# Patient Record
Sex: MALE | Race: WHITE | NOT HISPANIC OR LATINO | Employment: OTHER | URBAN - METROPOLITAN AREA
[De-identification: names, ages, dates, MRNs, and addresses within clinical notes are randomized per-mention and may not be internally consistent; named-entity substitution may affect disease eponyms.]

---

## 2017-11-06 ENCOUNTER — HISTORICAL (OUTPATIENT)
Dept: RADIOLOGY | Facility: HOSPITAL | Age: 77
End: 2017-11-06

## 2021-01-22 ENCOUNTER — HISTORICAL (OUTPATIENT)
Dept: ADMINISTRATIVE | Facility: HOSPITAL | Age: 81
End: 2021-01-22

## 2021-02-26 ENCOUNTER — HISTORICAL (OUTPATIENT)
Dept: ADMINISTRATIVE | Facility: HOSPITAL | Age: 81
End: 2021-02-26

## 2022-04-30 NOTE — OP NOTE
Patient:   Minoo Lux Jr            MRN: [id number]            FIN: [id number]               Age:   80 years     Sex:  Male     :  1940   Associated Diagnoses:   None   Author:   Chau Dash MD      Preoperative Diagnosis: Cataract Right eye    Postoperative Diagnosis: Cataract Right eye    Procedure: Phacoemulsification with intaocular lens implantations Right eye    Surgeon: Chau Dash MD    Assistant: Patricia Choi [facility]    Anestheisa: MAC    Complications: None    The patient was brought into the operating suite, where the patient was correctly identified as was the operative eye via timeout.  The patient was prepped and draped in a sterile ophthalmic fashion.  A lid speculum was placed in the operative eye and the microscope was brought into place.  A 1.0mm paracentesis was then made at (12) o'clock.  The anterior chamber was filled with Endocoat.  A (temporal) clear corneal incision was made with a 2.4 mm keratome.  A 6 mm corneal marking ring was used to peng the cornea centered over the visual axis.  A 5.00 mm continuous curvilinear capsulorhexis was fashioned using a cystotome and microcapsular forceps.  Hydrodissection and hydrodelineation was performed with upreserved 1% Xylocaine.  The nucleus was then phacoemulsified with the Abbott phacoemulsification hand-piece with a total of (4) EFX.  The cortex was then removed with the I/A hand-piece. The lens model (ZCB00) with a power of (20.5) was placed in the capsular bag.  The Helon was then removed from the eye with the I/A hand piece.  The anterior chamber was inflated and the wounds were hydrated with BSS.  The wounds were checked with Weck-Lulu sponges and found to be watertight.  The lid speculum was removed and topical antibiotics were placed on the operative eye.  The patient was brought to PACU in good condition.

## 2022-04-30 NOTE — OP NOTE
Patient:   Minoo Lux Jr            MRN: [id number]            FIN: [id number]               Age:   80 years     Sex:  Male     :  1940   Associated Diagnoses:   None   Author:   Chau Dash MD      Preoperative Diagnosis: Cataract Left eye    Postoperative Diagnosis: Cataract Left eye    Procedure: Phacoemulsification with intaocular lens implantations Left eye    Surgeon: Chau Dash MD    Assistant: Patricia Choi [facility]    Anestheisa: MAC    Complications: None    The patient was brought into the operating suite, where the patient was correctly identified as was the operative eye via timeout.  The patient was prepped and draped in a sterile ophthalmic fashion.  A lid speculum was placed in the operative eye and the microscope was brought into place.  A 1.0mm paracentesis was then made at (6) o'clock.  The anterior chamber was filled with Endocoat.  A (temporal) clear corneal incision was made with a 2.4 mm keratome.  A 6 mm corneal marking ring was used to peng the cornea centered over the visual axis.  A 5.00 mm continuous curvilinear capsulorhexis was fashioned using a cystotome and microcapsular forceps.  Hydrodissection and hydrodelineation was performed with upreserved 1% Xylocaine.  The nucleus was then phacoemulsified with the Abbott phacoemulsification hand-piece with a total of (5) EFX.  The cortex was then removed with the I/A hand-piece. The lens model (ZCB00) with a power of (21.0) was placed in the capsular bag.  The Helon was then removed from the eye with the I/A hand piece.  The anterior chamber was inflated and the wounds were hydrated with BSS.  The wounds were checked with Weck-Lulu sponges and found to be watertight.  The lid speculum was removed and topical antibiotics were placed on the operative eye.  The patient was brought to PACU in good condition.

## 2022-11-30 RX ORDER — OMEPRAZOLE 20 MG/1
20 CAPSULE, DELAYED RELEASE ORAL DAILY
COMMUNITY

## 2022-11-30 RX ORDER — ROSUVASTATIN CALCIUM 40 MG/1
10 TABLET, COATED ORAL NIGHTLY
COMMUNITY

## 2022-11-30 RX ORDER — SERTRALINE HYDROCHLORIDE 100 MG/1
100 TABLET, FILM COATED ORAL DAILY
COMMUNITY

## 2022-11-30 RX ORDER — CEVIMELINE HYDROCHLORIDE 30 MG/1
30 CAPSULE ORAL 3 TIMES DAILY
COMMUNITY

## 2022-11-30 RX ORDER — LISINOPRIL 20 MG/1
20 TABLET ORAL DAILY
COMMUNITY

## 2022-11-30 RX ORDER — GABAPENTIN 300 MG/1
300 CAPSULE ORAL 3 TIMES DAILY
COMMUNITY

## 2022-11-30 RX ORDER — MECLIZINE HYDROCHLORIDE 50 MG/1
50 TABLET ORAL 3 TIMES DAILY PRN
COMMUNITY

## 2022-11-30 RX ORDER — PROPRANOLOL HYDROCHLORIDE 10 MG/1
10 TABLET ORAL 3 TIMES DAILY
COMMUNITY

## 2022-12-01 ENCOUNTER — CLINICAL SUPPORT (OUTPATIENT)
Dept: PREADMISSION TESTING | Facility: HOSPITAL | Age: 82
End: 2022-12-01
Attending: SURGERY
Payer: OTHER GOVERNMENT

## 2022-12-01 DIAGNOSIS — Z01.818 OTHER SPECIFIED PRE-OPERATIVE EXAMINATION: Primary | ICD-10-CM

## 2022-12-01 DIAGNOSIS — Z01.818 OTHER SPECIFIED PRE-OPERATIVE EXAMINATION: ICD-10-CM

## 2022-12-01 PROCEDURE — 93005 ELECTROCARDIOGRAM TRACING: CPT

## 2022-12-01 PROCEDURE — 93010 ELECTROCARDIOGRAM REPORT: CPT | Mod: ,,, | Performed by: STUDENT IN AN ORGANIZED HEALTH CARE EDUCATION/TRAINING PROGRAM

## 2022-12-01 PROCEDURE — 93010 EKG 12-LEAD: ICD-10-PCS | Mod: ,,, | Performed by: STUDENT IN AN ORGANIZED HEALTH CARE EDUCATION/TRAINING PROGRAM

## 2022-12-06 ENCOUNTER — HOSPITAL ENCOUNTER (OUTPATIENT)
Facility: HOSPITAL | Age: 82
Discharge: HOME | End: 2022-12-06
Attending: SURGERY | Admitting: SURGERY
Payer: OTHER GOVERNMENT

## 2022-12-06 ENCOUNTER — ANESTHESIA EVENT (OUTPATIENT)
Dept: SURGERY | Facility: HOSPITAL | Age: 82
End: 2022-12-06
Payer: OTHER GOVERNMENT

## 2022-12-06 ENCOUNTER — ANESTHESIA (OUTPATIENT)
Dept: SURGERY | Facility: HOSPITAL | Age: 82
End: 2022-12-06
Payer: OTHER GOVERNMENT

## 2022-12-06 PROCEDURE — 37000009 HC ANESTHESIA EA ADD 15 MINS: Performed by: SURGERY

## 2022-12-06 PROCEDURE — 71000033 HC RECOVERY, INTIAL HOUR: Performed by: SURGERY

## 2022-12-06 PROCEDURE — 25000003 PHARM REV CODE 250

## 2022-12-06 PROCEDURE — 01810 ANES PX NRV MUSC F/ARM WRST: CPT | Performed by: SURGERY

## 2022-12-06 PROCEDURE — 63600175 PHARM REV CODE 636 W HCPCS: Performed by: NURSE ANESTHETIST, CERTIFIED REGISTERED

## 2022-12-06 PROCEDURE — 36000707: Performed by: SURGERY

## 2022-12-06 PROCEDURE — 71000015 HC POSTOP RECOV 1ST HR: Performed by: SURGERY

## 2022-12-06 PROCEDURE — 36000706: Performed by: SURGERY

## 2022-12-06 PROCEDURE — 37000008 HC ANESTHESIA 1ST 15 MINUTES: Performed by: SURGERY

## 2022-12-06 PROCEDURE — 63600175 PHARM REV CODE 636 W HCPCS: Performed by: ANESTHESIOLOGY

## 2022-12-06 PROCEDURE — 64415 NJX AA&/STRD BRCH PLXS IMG: CPT | Mod: 59,LT | Performed by: ANESTHESIOLOGY

## 2022-12-06 PROCEDURE — 63600175 PHARM REV CODE 636 W HCPCS

## 2022-12-06 PROCEDURE — 25000003 PHARM REV CODE 250: Performed by: NURSE ANESTHETIST, CERTIFIED REGISTERED

## 2022-12-06 PROCEDURE — 76942 ECHO GUIDE FOR BIOPSY: CPT | Performed by: ANESTHESIOLOGY

## 2022-12-06 PROCEDURE — 71000016 HC POSTOP RECOV ADDL HR: Performed by: SURGERY

## 2022-12-06 RX ORDER — LIDOCAINE HYDROCHLORIDE 10 MG/ML
INJECTION, SOLUTION EPIDURAL; INFILTRATION; INTRACAUDAL; PERINEURAL
Status: DISCONTINUED | OUTPATIENT
Start: 2022-12-06 | End: 2022-12-06

## 2022-12-06 RX ORDER — SODIUM CITRATE AND CITRIC ACID MONOHYDRATE 334; 500 MG/5ML; MG/5ML
SOLUTION ORAL
Status: COMPLETED
Start: 2022-12-06 | End: 2022-12-06

## 2022-12-06 RX ORDER — SODIUM CHLORIDE, SODIUM GLUCONATE, SODIUM ACETATE, POTASSIUM CHLORIDE AND MAGNESIUM CHLORIDE 30; 37; 368; 526; 502 MG/100ML; MG/100ML; MG/100ML; MG/100ML; MG/100ML
INJECTION, SOLUTION INTRAVENOUS CONTINUOUS
Status: DISCONTINUED | OUTPATIENT
Start: 2022-12-06 | End: 2022-12-06

## 2022-12-06 RX ORDER — ROPIVACAINE HYDROCHLORIDE 5 MG/ML
INJECTION, SOLUTION EPIDURAL; INFILTRATION; PERINEURAL
Status: COMPLETED
Start: 2022-12-06 | End: 2022-12-06

## 2022-12-06 RX ORDER — ROPIVACAINE HYDROCHLORIDE 5 MG/ML
INJECTION, SOLUTION EPIDURAL; INFILTRATION; PERINEURAL
Status: COMPLETED | OUTPATIENT
Start: 2022-12-06 | End: 2022-12-06

## 2022-12-06 RX ORDER — MIDAZOLAM HYDROCHLORIDE 1 MG/ML
INJECTION INTRAMUSCULAR; INTRAVENOUS
Status: COMPLETED
Start: 2022-12-06 | End: 2022-12-06

## 2022-12-06 RX ORDER — PROPOFOL 10 MG/ML
VIAL (ML) INTRAVENOUS
Status: DISCONTINUED | OUTPATIENT
Start: 2022-12-06 | End: 2022-12-06

## 2022-12-06 RX ORDER — LIDOCAINE HYDROCHLORIDE 10 MG/ML
1 INJECTION, SOLUTION EPIDURAL; INFILTRATION; INTRACAUDAL; PERINEURAL ONCE
Status: DISCONTINUED | OUTPATIENT
Start: 2022-12-06 | End: 2022-12-06

## 2022-12-06 RX ORDER — SODIUM CHLORIDE, SODIUM LACTATE, POTASSIUM CHLORIDE, CALCIUM CHLORIDE 600; 310; 30; 20 MG/100ML; MG/100ML; MG/100ML; MG/100ML
INJECTION, SOLUTION INTRAVENOUS CONTINUOUS
Status: DISCONTINUED | OUTPATIENT
Start: 2022-12-06 | End: 2022-12-06

## 2022-12-06 RX ORDER — EPHEDRINE SULFATE 50 MG/ML
INJECTION, SOLUTION INTRAVENOUS
Status: DISCONTINUED | OUTPATIENT
Start: 2022-12-06 | End: 2022-12-06

## 2022-12-06 RX ORDER — MIDAZOLAM HYDROCHLORIDE 1 MG/ML
1 INJECTION, SOLUTION INTRAMUSCULAR; INTRAVENOUS ONCE
Status: COMPLETED | OUTPATIENT
Start: 2022-12-06 | End: 2022-12-06

## 2022-12-06 RX ORDER — DEXAMETHASONE SODIUM PHOSPHATE 4 MG/ML
INJECTION, SOLUTION INTRA-ARTICULAR; INTRALESIONAL; INTRAMUSCULAR; INTRAVENOUS; SOFT TISSUE
Status: DISCONTINUED | OUTPATIENT
Start: 2022-12-06 | End: 2022-12-06

## 2022-12-06 RX ORDER — SODIUM CITRATE AND CITRIC ACID MONOHYDRATE 334; 500 MG/5ML; MG/5ML
30 SOLUTION ORAL ONCE
Status: COMPLETED | OUTPATIENT
Start: 2022-12-06 | End: 2022-12-06

## 2022-12-06 RX ORDER — ONDANSETRON 2 MG/ML
INJECTION INTRAMUSCULAR; INTRAVENOUS
Status: DISCONTINUED | OUTPATIENT
Start: 2022-12-06 | End: 2022-12-06

## 2022-12-06 RX ADMIN — ONDANSETRON 4 MG: 2 INJECTION INTRAMUSCULAR; INTRAVENOUS at 09:12

## 2022-12-06 RX ADMIN — ROPIVACAINE HYDROCHLORIDE 30 ML: 5 INJECTION, SOLUTION EPIDURAL; INFILTRATION; PERINEURAL at 07:12

## 2022-12-06 RX ADMIN — MIDAZOLAM HYDROCHLORIDE 1 MG: 1 INJECTION, SOLUTION INTRAMUSCULAR; INTRAVENOUS at 07:12

## 2022-12-06 RX ADMIN — LIDOCAINE HYDROCHLORIDE 50 MG: 10 INJECTION, SOLUTION EPIDURAL; INFILTRATION; INTRACAUDAL; PERINEURAL at 08:12

## 2022-12-06 RX ADMIN — DEXAMETHASONE SODIUM PHOSPHATE 4 MG: 4 INJECTION, SOLUTION INTRA-ARTICULAR; INTRALESIONAL; INTRAMUSCULAR; INTRAVENOUS; SOFT TISSUE at 08:12

## 2022-12-06 RX ADMIN — PROPOFOL 30 MG: 10 INJECTION, EMULSION INTRAVENOUS at 08:12

## 2022-12-06 RX ADMIN — SODIUM CITRATE AND CITRIC ACID MONOHYDRATE 30 ML: 334; 500 SOLUTION ORAL at 07:12

## 2022-12-06 RX ADMIN — PROPOFOL 100 MG: 10 INJECTION, EMULSION INTRAVENOUS at 08:12

## 2022-12-06 RX ADMIN — SODIUM CHLORIDE, POTASSIUM CHLORIDE, SODIUM LACTATE AND CALCIUM CHLORIDE: 600; 310; 30; 20 INJECTION, SOLUTION INTRAVENOUS at 08:12

## 2022-12-06 RX ADMIN — EPHEDRINE SULFATE 5 MG: 50 INJECTION INTRAVENOUS at 09:12

## 2022-12-06 RX ADMIN — SODIUM CITRATE AND CITRIC ACID MONOHYDRATE 30 ML: 500; 334 SOLUTION ORAL at 07:12

## 2022-12-06 RX ADMIN — PROPOFOL 40 MG: 10 INJECTION, EMULSION INTRAVENOUS at 08:12

## 2022-12-06 NOTE — ANESTHESIA PROCEDURE NOTES
Peripheral Block    Patient location during procedure: pre-op   Block not for primary anesthetic.  Reason for block: at surgeon's request and post-op pain management   Post-op Pain Location: L   Start time: 12/6/2022 7:52 AM  Timeout: 12/6/2022 7:52 AM   End time: 12/6/2022 8:02 AM    Staffing  Authorizing Provider: Constantine Vázquez MD  Performing Provider: Constantine Vázquez MD    Preanesthetic Checklist  Completed: patient identified, IV checked, site marked, risks and benefits discussed, surgical consent, monitors and equipment checked, pre-op evaluation and timeout performed  Peripheral Block  Patient position: supine  Prep: ChloraPrep and Mask worn, hand hygeine performed, sterile gloves worn  Patient monitoring: cardiac monitor, continuous pulse ox and frequent blood pressure checks (RN monitoring vitals throughout procedure.)  Block type: axillary  Laterality: left  Injection technique: single shot  Needle  Needle type: Stimuplex   Needle gauge: 21 G  Needle length: 2 in  Needle localization: anatomical landmarks and ultrasound guidance  Needle insertion depth: 4 cm   -ultrasound image captured on disc.  Assessment  Injection assessment: negative aspiration, negative parasthesia and local visualized surrounding nerve  Paresthesia pain: none  Heart rate change: no  Slow fractionated injection: yes  Pain Tolerance: comfortable throughout block  Medications:    Medications: ropivacaine (NAROPIN) injection 0.5% - Perineural   30 mL - 12/6/2022 7:59:00 AM    Additional Notes  Block..See EMR for any note re current/previous paresthesia, dysesthesias or chronic pain complaints in limb to be blocked.  US used to observe needle trajectory, needle placed in close proximity to appropriate nerve structures, they appeared anatomically normal.  Deposition of LA in close proximity to nerve structures observed.  Perm image saved.  Stimulation of appropriate muscles noted @ 0.5ma with a + Ankur test.  No resistance to  injection was encountered.  No paraesthesia or aspiration of blood observed.  No pain with injection.  See anesthesia chart for any drugs used to supplement the block. Sedation was provided at a level for patient to be able to communicate any discomfort.

## 2022-12-06 NOTE — CARE UPDATE
Patient awakened,abrams,oriented/reassured his, he denied c/o, exchanging well. He can slightly wiggle his left digits, and can lift left arm slowly, arm-hand-digits retain the numbness from pre-op pain block.

## 2022-12-06 NOTE — DISCHARGE INSTRUCTIONS
Patient Education        Carpal Tunnel Release Discharge Instructions             What care is needed at home?   This is what you will need to know:  Do not remove the dressing.  Wear the sling until tomorrow including sleeping tonight. Use pillows to prop up your wrist. This will help with swelling.  Place an ice pack wrapped in a towel over the painful part. Never put ice right on the skin. Do not leave the ice on more than 10 to 15 minutes at a time. You can do this a 3-4 times a day.  Keep your splint in place. This will keep your wrist from moving. Leave the dressing on until your visit with the doctor. Keep it clean and dry.  You may take a shower starting tomorrow. Wrap your arm in a plastic bag.   Do not lift anything over 10 pounds (4.5 kg).  Ask the doctor when you may go back to your normal activities like work and driving.  Be sure to wash your hands before touching your wound or dressing.    What follow-up care is needed?   Your doctor may ask you to make visits to the office to check on your progress. Be sure to keep these visits.  If you have stitches or staples, you will need to have them taken out. Your doctor will often want to do this in 1 to 2 weeks.    What lifestyle changes are needed?   Avoid doing repetitive movements with your wrist and hand, if possible. If you have to do repeat movements, take rest breaks often.  If you sit at a computer, make sure your keyboard, mouse, desk, and chair are in the right positions so you do not put stress on the wrist. Think about using a special mouse and keyboard that are made to protect your wrist and hand.  If you have an assembly job, try alternating tasks, if possible.  Avoid holding your wrist and hand in the same position for too long.  Massage your wrist, palms, and back of the hands.    What problems could happen?   Infection  Bleeding  Nerve injury  Blood vessel injury  Ongoing wrist pain  Carpal tunnel syndrome returns    When do I need to call  the doctor?   Signs of infection. These include a fever of 100.4°F (38°C) or higher, chills, wound that will not heal.  Signs of wound infection. These include swelling, redness, warmth around the wound; too much pain when touched; yellowish, greenish, or bloody discharge; foul smell coming from the cut site; cut site opens up.  More numbness in hand

## 2022-12-06 NOTE — CARE UPDATE
Dr. Vázquez rounding and updated on his status, v/s. No new orders given and approved his transfer to phase II room at any time.

## 2022-12-06 NOTE — ANESTHESIA PREPROCEDURE EVALUATION
"                                                                                                             12/06/2022  Minoo Lux Jr. is a 82 y.o., male with HTN and other medical problems listed in the EMR      Pre-op Assessment    I have reviewed the Patient Summary Reports.     I have reviewed the Nursing Notes. I have reviewed the NPO Status.   I have reviewed the Medications.     Review of Systems      Physical Exam  General: Well nourished and Cooperative    Airway:  Mallampati: II   Mouth Opening: Normal  TM Distance: Normal  Tongue: Normal  Neck ROM: Normal ROM    Chest/Lungs:  Clear to auscultation    Heart:  Rate: Normal        Anesthesia Plan  Type of Anesthesia, risks & benefits discussed:    Anesthesia Type: Regional  Intra-op Monitoring Plan: Standard ASA Monitors  Post Op Pain Control Plan: multimodal analgesia  Informed Consent: Informed consent signed with the Patient and all parties understand the risks and agree with anesthesia plan.  All questions answered.   ASA Score: 2  Day of Surgery Review of History & Physical: H&P Update referred to the surgeon/provider.    Ready For Surgery From Anesthesia Perspective.     I explained anesthesia plan to patient/responsbile party if available.  Anesthesia consent done going over the material facts, risks, complications & alternatives, obtained which includes the possibility of altering the anesthesia plan.  I reviewed problem list, appropriate labs, any workup, Xray, EKG etc noted below.  Patients condition is satisfactory to proceed with anesthesia plan unless otherwise noted (see anesthesia chart for details of the anesthesia plan carried out).      Pre-operative evaluation for Procedure(s) (LRB):  RELEASE, CARPAL TUNNEL Left   Axillary Block (Left)  RELEASE, HAND, FOR DEQUERVAIN'S TENOSYNOVITIS (Left)    /80   Pulse 61   Temp 36.7 °C (98.1 °F) (Oral)   Ht 5' 6" (1.676 m)   Wt 76.5 kg (168 lb 10.4 oz)   SpO2 (!) 94%   BMI 27.22 " kg/m²     There is no problem list on file for this patient.      Review of patient's allergies indicates:   Allergen Reactions    Quinine sulfate        Current Outpatient Medications   Medication Instructions    cevimeline (EVOXAC) 30 mg, Oral, 3 times daily    gabapentin (NEURONTIN) 300 mg, Oral, 3 times daily    lisinopriL (PRINIVIL,ZESTRIL) 20 mg, Oral, Daily    meclizine (ANTIVERT) 50 mg, Oral, 3 times daily PRN    omeprazole (PRILOSEC) 20 mg, Oral, Daily    propranoloL (INDERAL) 10 mg, Oral, 3 times daily    rosuvastatin (CRESTOR) 10 mg, Oral, Nightly    sertraline (ZOLOFT) 100 mg, Oral, Daily       Past Surgical History:   Procedure Laterality Date    BACK SURGERY      HERNIA REPAIR      MANDIBLE FRACTURE SURGERY         Social History     Socioeconomic History    Marital status:    Tobacco Use    Smoking status: Never    Smokeless tobacco: Never   Substance and Sexual Activity    Alcohol use: Never    Drug use: Never    Sexual activity: Not Currently       No results found for: WBC, HGB, HCT, MCV, PLT       BMP  No results found for: HCT, NA, K, BUN, CREATININE, CALCIUM     INR  No results for input(s): PT, INR, PROTIME, APTT in the last 72 hours.        Diagnostic Studies:      EKG:  Results for orders placed or performed in visit on 12/01/22   EKG 12-lead    Collection Time: 12/01/22  8:12 AM    Narrative    Test Reason : Z01.818,    Vent. Rate : 059 BPM     Atrial Rate : 059 BPM     P-R Int : 184 ms          QRS Dur : 098 ms      QT Int : 420 ms       P-R-T Axes : 044 -21 027 degrees     QTc Int : 415 ms    Sinus bradycardia  Otherwise normal ECG  No previous ECGs available  Confirmed by Rashad Garcia MD ([id number]) on 12/3/2022 8:33:51 AM    Referred By: CARO NICOLE           Confirmed By:Rashad Garcia MD          .

## 2022-12-06 NOTE — CARE UPDATE
Received patient from the OR, he is asleep, respirations full-regular-deep-clear,hob up 30 degrees, left arm in sling and supported by pillow, chin lift not necessary at present, nurse remains at bedside with constant assessment and observation.

## 2022-12-06 NOTE — ANESTHESIA POSTPROCEDURE EVALUATION
Anesthesia Post Evaluation    Patient: Minoo Lux Jr.    Procedure(s) Performed: Procedure(s) (LRB):  RELEASE, CARPAL TUNNEL Left   Axillary Block (Left)  RELEASE, HAND, FOR DEQUERVAIN'S TENOSYNOVITIS (Left)    Final Anesthesia Type: general (/Regional//MAC)      Patient location during evaluation: PACU  Post-procedure mental status: @ basline.  Post-procedure vital signs: reviewed and stable  Pain management: adequate    PONV status: See postop meds for drugs used to control n/v if any.  Anesthetic complications: no      Cardiovascular status: blood pressure returned to baseline  Respiratory status: @ baseline.  Hydration status: euvolemic            Vitals Value Taken Time   /84 12/06/22 1044   Temp 36.4 °C (97.6 °F) 12/06/22 1000   Pulse 73 12/06/22 1044   Resp 18 12/06/22 1000   SpO2 97 % 12/06/22 1044         Event Time   Out of Recovery 09:56:00         Pain/Jane Score: Jane Score: 10 (12/6/2022 10:00 AM)

## 2022-12-06 NOTE — OP NOTE
[facility] [address] [place] [address]    PATIENT NAME:      MENDEL ROUSE  YOB: 1940  CSN:               [id number]  MRN:               [id number]  ADMIT DATE:        12/06/2022 06:44:00  PHYSICIAN:         Fabien Gold MD                          OPERATIVE REPORT      DATE OF SURGERY:    12/06/2022 00:00:00    SURGEON:  Fabien Gold MD    PREOPERATIVE DIAGNOSES:    1. Carpal tunnel, left wrist.  2. De Quervain tenosynovitis, left wrist.    POSTOPERATIVE DIAGNOSES:    1. Carpal tunnel, left wrist.  2. De Quervain tenosynovitis, left wrist.    PROCEDURES:    1. Carpal tunnel release, left wrist.  2. De Quervain release, left wrist.    ANESTHESIA:  Axillary block with supplemental LMA anesthesia.    DESCRIPTION OF PROCEDURE:  In the operating suite under LMA anesthesia, the left   arm was prepped and draped in a sterile fashion.  Arm was exsanguinated and   tourniquet inflated to 250 mmHg.  A transverse incision was placed over the   radial styloid area where there was marked thickening of the 1st compartment.    Skin and subcutaneous tissue incised.  Neurovascular bundle was retracted.  The   1st compartment was identified and opened throughout its entire length with a 69   Manatee blade.  Traction on the tendons revealed no further areas of stenosis.    Saline-soaked gauze was then placed in the incision and attention was turned to   the proximal palm, which was explored through a small proximal curved incision.    Skin and subcutaneous tissue incised.  Bleeding controlled with Bovie cautery.    Median nerve was identified at the level of the distal wrist crease and the   carpal ligament transected on the ulnar border of the canal throughout the   entire length of the canal using a 69 Manatee blade.  During dissection, care was   taken to identify and preserve the recurrent  motor branch.  I tunneled   proximally in distal forearm and released the subcutaneous fascia.  The   epineurium surrounding the nerve was gently teased with fine pickups and   forceps.  Once the nerve was completely freed, the tourniquet was released.    Final hemostasis was obtained.  The de Quervain incision was closed with a   running subcuticular 4-0 Vicryl suture.  The palmar incision was closed with   vertical mattress sutures of 5-0 and 6-0 Prolene.  A sterile splint dressing was   then applied.  Procedure terminated.        ______________________________  MD ZENIA Espinal/AQS  DD:  12/06/2022  Time:  09:25AM  DT:  12/06/2022  Time:  09:45AM  Job #:  [id number]      OPERATIVE REPORT

## 2022-12-06 NOTE — TRANSFER OF CARE
"Anesthesia Transfer of Care Note    Patient: Minoo Lux Jr.    Procedure(s) Performed: Procedure(s) (LRB):  RELEASE, CARPAL TUNNEL Left   Axillary Block (Left)  RELEASE, HAND, FOR DEQUERVAIN'S TENOSYNOVITIS (Left)    Patient location: PACU    Anesthesia Type: general    Transport from OR: Transported from OR on room air with adequate spontaneous ventilation    Post pain: adequate analgesia    Post assessment: no apparent anesthetic complications    Post vital signs: stable    Level of consciousness: responds to stimulation    Nausea/Vomiting: no nausea/vomiting    Complications: none    Transfer of care protocol was followed      Last vitals:   Visit Vitals  /83 (BP Location: Right arm, Patient Position: Lying)   Pulse 81   Temp 36.2 °C (97.2 °F) (Temporal)   Resp 16   Ht 5' 6" (1.676 m)   Wt 76.5 kg (168 lb 10.4 oz)   SpO2 98%   BMI 27.22 kg/m²     "

## 2022-12-06 NOTE — ANESTHESIA PROCEDURE NOTES
Intubation    Date/Time: 12/6/2022 8:39 AM  Performed by: Comfort Tena CRNA  Authorized by: Constantine Vázquez MD     Intubation:     Induction:  Intravenous    Intubated:  Postinduction    Mask Ventilation:  Not attempted    Attempts:  1    Attempted By:  Staff anesthesiologist    Method of Intubation:  Blind intubation    Difficult Airway Encountered?: No      Complications:  None    Airway Device:  Supraglottic airway/LMA    Airway Device Size:  4.0    Style/Cuff Inflation:  Cuffed (inflated to minimal occlusive pressure)    Secured at:  The lips    Placement Verified By:  Capnometry    Complicating Factors:  None    Findings Post-Intubation:  Atraumatic/condition of teeth unchanged

## 2022-12-08 VITALS
SYSTOLIC BLOOD PRESSURE: 149 MMHG | RESPIRATION RATE: 18 BRPM | WEIGHT: 168.63 LBS | TEMPERATURE: 98 F | HEART RATE: 73 BPM | DIASTOLIC BLOOD PRESSURE: 84 MMHG | BODY MASS INDEX: 27.1 KG/M2 | OXYGEN SATURATION: 97 % | HEIGHT: 66 IN

## 2024-12-18 DIAGNOSIS — I69.911 MEMORY DEFICIT FOLLOWING UNSPECIFIED CEREBROVASCULAR DISEASE: Primary | ICD-10-CM

## 2025-04-16 ENCOUNTER — OFFICE VISIT (OUTPATIENT)
Dept: NEUROLOGY | Facility: CLINIC | Age: 85
End: 2025-04-16
Payer: OTHER GOVERNMENT

## 2025-04-16 VITALS
HEIGHT: 66 IN | BODY MASS INDEX: 27 KG/M2 | DIASTOLIC BLOOD PRESSURE: 86 MMHG | WEIGHT: 168 LBS | SYSTOLIC BLOOD PRESSURE: 138 MMHG

## 2025-04-16 DIAGNOSIS — I69.911 MEMORY DEFICIT FOLLOWING UNSPECIFIED CEREBROVASCULAR DISEASE: Primary | ICD-10-CM

## 2025-04-16 DIAGNOSIS — G44.019 EPISODIC CLUSTER HEADACHE, NOT INTRACTABLE: ICD-10-CM

## 2025-04-16 PROCEDURE — 99999 PR PBB SHADOW E&M-EST. PATIENT-LVL III: CPT | Mod: PBBFAC,,, | Performed by: PSYCHIATRY & NEUROLOGY

## 2025-04-16 PROCEDURE — 99205 OFFICE O/P NEW HI 60 MIN: CPT | Mod: S$PBB,,, | Performed by: PSYCHIATRY & NEUROLOGY

## 2025-04-16 PROCEDURE — 99213 OFFICE O/P EST LOW 20 MIN: CPT | Mod: PBBFAC | Performed by: PSYCHIATRY & NEUROLOGY

## 2025-04-16 RX ORDER — LOSARTAN POTASSIUM 50 MG/1
25 TABLET ORAL
COMMUNITY
Start: 2025-03-06

## 2025-04-16 RX ORDER — DONEPEZIL HYDROCHLORIDE 10 MG/1
1 TABLET, FILM COATED ORAL DAILY
COMMUNITY
Start: 2025-01-24

## 2025-04-16 RX ORDER — CITALOPRAM 40 MG/1
40 TABLET, FILM COATED ORAL DAILY
COMMUNITY

## 2025-04-16 RX ORDER — PROPRANOLOL HYDROCHLORIDE 10 MG/1
10 TABLET ORAL 2 TIMES DAILY
Qty: 180 TABLET | Refills: 4 | Status: SHIPPED | OUTPATIENT
Start: 2025-04-16

## 2025-04-16 RX ORDER — LEVOTHYROXINE SODIUM 25 UG/1
25 TABLET ORAL
COMMUNITY
Start: 2025-03-06

## 2025-04-16 RX ORDER — NAPROXEN SODIUM 220 MG/1
81 TABLET, FILM COATED ORAL DAILY
COMMUNITY

## 2025-04-16 RX ORDER — CETIRIZINE HYDROCHLORIDE 10 MG/1
10 TABLET ORAL
COMMUNITY
Start: 2024-11-13

## 2025-04-16 RX ORDER — BUSPIRONE HYDROCHLORIDE 15 MG/1
15 TABLET ORAL 3 TIMES DAILY
COMMUNITY

## 2025-04-16 NOTE — PROGRESS NOTES
Subjective     Patient ID: Minoo Lux Jr. is a 84 y.o. male.    Chief Complaint: memory defecit following unspecified cerebrovascular disease (New pt - memory - referred by Dr. Delroy Mckinney)    HPI  1-2 y of memory loss ; mild  Stopped driving  Initially on galantamine; now on aricept and seems to be doing better  Mmse 30/30 today  Cth wm changes  Likely DM; A1c 6 for many years  Pmh migraine; lately has been getting more of a cluster type headache; 2-3/week; usually middle of night; frontal; no autonomic or GI sx with it  Takes fiorcet and/or tylenol artritis  Also on 5 mg inderal bid and gabapentin 300 bid  Review of Systems  The remainder of the 14 system ROS is noncontributory or negative unless mentioned/reviewed above.       Objective     Physical Exam  Mental Status: Alert and oriented x3. Language is fluent with good comprehension.    Cranial Nerve: Pupils are equal, round, and reactive to light. Visual fields are intact to confrontation. Normal fundi. Ocular movements are intact. Face is symmetric at rest and with activation with intact sensation throughout. Hearing intact to finger rub bilaterally. Muscles of tongue and palate activate symmetrically. No dysarthria. Strength is full in sternocleidomastoid and trapezius bilaterally.    Motor: Muscle bulk and tone are normal. Strength is 5/5 in all four extremities both proximally and distally. Intact fine motor movements bilaterally. There is no pronator drift or satelliting on arm roll.    Sensory: Sensation is intact to light touch, pinprick, vibration, and proprioception throughout. Romberg is negative; poor balance in general.    Reflexes: 2+ and symmetric at the biceps, triceps, brachioradialis, patella, and Achilles bilaterally. Plantar response is flexor bilaterally.    Coordination: No dysmetria on finger-nose-finger or heel-knee-shin. Normal rapid alternating movements. Fast finger tapping with normal amplitude and speed.    Gait: Narrow  based with normal stride length and good arm swing bilaterally. Able to walk on heels, toes, and in tandem.         Assessment and Plan     1. Memory deficit following unspecified cerebrovascular disease    2. Episodic cluster headache, not intractable    Other orders  -     propranoloL (INDERAL) 10 MG tablet; Take 1 tablet (10 mg total) by mouth 2 (two) times daily.  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

## 2025-04-28 ENCOUNTER — TELEPHONE (OUTPATIENT)
Dept: NEUROLOGY | Facility: CLINIC | Age: 85
End: 2025-04-28
Payer: OTHER GOVERNMENT

## 2025-04-28 RX ORDER — DONEPEZIL HYDROCHLORIDE 10 MG/1
10 TABLET, FILM COATED ORAL DAILY
Qty: 90 TABLET | Refills: 4 | Status: SHIPPED | OUTPATIENT
Start: 2025-04-28

## 2025-08-18 ENCOUNTER — OFFICE VISIT (OUTPATIENT)
Dept: NEUROLOGY | Facility: CLINIC | Age: 85
End: 2025-08-18
Payer: OTHER GOVERNMENT

## 2025-08-18 VITALS
DIASTOLIC BLOOD PRESSURE: 80 MMHG | BODY MASS INDEX: 26.2 KG/M2 | WEIGHT: 163 LBS | HEIGHT: 66 IN | SYSTOLIC BLOOD PRESSURE: 114 MMHG | RESPIRATION RATE: 16 BRPM

## 2025-08-18 DIAGNOSIS — G44.019 EPISODIC CLUSTER HEADACHE, NOT INTRACTABLE: ICD-10-CM

## 2025-08-18 DIAGNOSIS — I69.911 MEMORY DEFICIT FOLLOWING UNSPECIFIED CEREBROVASCULAR DISEASE: Primary | ICD-10-CM

## 2025-08-18 PROCEDURE — 99999 PR PBB SHADOW E&M-EST. PATIENT-LVL IV: CPT | Mod: PBBFAC,,, | Performed by: PSYCHIATRY & NEUROLOGY

## 2025-08-18 PROCEDURE — 99214 OFFICE O/P EST MOD 30 MIN: CPT | Mod: S$PBB,,, | Performed by: PSYCHIATRY & NEUROLOGY

## 2025-08-18 PROCEDURE — 99214 OFFICE O/P EST MOD 30 MIN: CPT | Mod: PBBFAC | Performed by: PSYCHIATRY & NEUROLOGY

## 2025-08-18 RX ORDER — MULTIVITAMIN
1 TABLET ORAL DAILY
COMMUNITY

## 2025-08-18 RX ORDER — BUTALBITAL, ACETAMINOPHEN AND CAFFEINE 50; 325; 40 MG/1; MG/1; MG/1
1 TABLET ORAL
COMMUNITY

## (undated) DEVICE — PADDING CAST 4IN DELTA ROLL

## (undated) DEVICE — CAST PLSTR SPLINT X-FAST 3X15

## (undated) DEVICE — SOL NACL IRR 1000ML BTL

## (undated) DEVICE — GLOVE PROTEXIS PI SYN SURG 7.5

## (undated) DEVICE — STOCKINET 4INX48

## (undated) DEVICE — BANDAGE ELAS COMPR 4IN 5.8YD

## (undated) DEVICE — TOURNIQUET SB QC DP 18X4IN

## (undated) DEVICE — Device

## (undated) DEVICE — GLOVE PROTEXIS PI SYN SURG 6.0

## (undated) DEVICE — DRAPE HAND STERILE

## (undated) DEVICE — BLADE SCALP OPHTL BEVEL STR

## (undated) DEVICE — SUPPORT ULNA NERVE PROTECTOR

## (undated) DEVICE — DRESSING N ADH OIL EMUL 3X3

## (undated) DEVICE — SPONGE LAP STRL 18X18IN

## (undated) DEVICE — GLOVE PROTEXIS BLUE LATEX 7

## (undated) DEVICE — ELECTRODE PATIENT RETURN DISP

## (undated) DEVICE — SUT 6/0 18IN PROLENE BL MO

## (undated) DEVICE — PAD PREP CUFFED NS 24X48IN

## (undated) DEVICE — SUT 5/0 18IN PROLENE BL MO

## (undated) DEVICE — SUT VICRYL PLUS 4-0 P3 18IN

## (undated) DEVICE — SLING ARM LARGE

## (undated) DEVICE — SEE MEDLINE ITEM 146410